# Patient Record
Sex: FEMALE | Race: WHITE | NOT HISPANIC OR LATINO | Employment: FULL TIME | URBAN - METROPOLITAN AREA
[De-identification: names, ages, dates, MRNs, and addresses within clinical notes are randomized per-mention and may not be internally consistent; named-entity substitution may affect disease eponyms.]

---

## 2024-09-26 ENCOUNTER — TELEPHONE (OUTPATIENT)
Dept: DERMATOLOGY | Facility: CLINIC | Age: 24
End: 2024-09-26
Payer: COMMERCIAL

## 2024-09-26 NOTE — TELEPHONE ENCOUNTER
Left Voicemail (1st Attempt) for the patient to call back and schedule the following:    Appointment type: New Dermatology    Provider: any    Return date: next available     Specialty phone number: [phone]    Additonal Notes: new

## 2024-09-26 NOTE — TELEPHONE ENCOUNTER
[facility]    Phone Message    May a detailed message be left on voicemail: yes     Reason for Call: Symptoms or Concerns         Current symptom or concern: mole of concern    Symptoms have been present for:  4 month(s)    Has patient previously been seen for this? No    By : NA    Date: NA    Are there any new or worsening symptoms? Yes: Patient first noticed the mole at the beginning of summer. Patient thought the mole would go away or not grow. It grew in size. Turned into a boil. Almost, pimple like in character. Then seemed like it deflated and now it's just black. Please call patient back to discuss. Thanks.     Action Taken: te sent     Travel Screening: Not Applicable     Date of Service: